# Patient Record
(demographics unavailable — no encounter records)

---

## 2024-10-21 NOTE — HISTORY OF PRESENT ILLNESS
[FreeTextEntry1] : This 36-year-old female is here for a well woman exam and Pap test.  He is also concerned about possible STDs.  Her last Pap test was normal with HPV negative on July 29, 2022.  Her last pelvic ultrasound on July 29, 2022 was normal with 12 mm endometrium and normal ovaries.

## 2024-11-05 NOTE — PHYSICAL EXAM
[de-identified] : Patient is a well-healed surgical skin incision.  Range of motion to the fingers.  There is no triggering.  There is no erythema ecchymoses or abrasions.  No masses.  Strength without thenar atrophy.  There is some tenderness to palpation more on the ulnar aspect of the middle finger with no palpable mass that is at the level of the digital crease

## 2024-11-05 NOTE — ASSESSMENT
[FreeTextEntry1] : Patient removal of hardware.  She has done well from that part.  She will continue with normal activities.  We discussed a cortisone injection to the area is not something that she wanted to do.  We discussed anti-inflammatory medication but she is already taking Arnica.  She will see us back in 2 months to see how she is doing.  Contact the office

## 2024-11-07 NOTE — DISCUSSION/SUMMARY
[FreeTextEntry1] : On pelvic exam today she was noted to have a heavy white discharge with dark brown and black pieces of what appears to be a tampon.  I was able to do a vaginal culture.  And removed as much retained tampon tissue as I could digitally.  Another vaginal culture was taken today.  Specially since she believes that the last time she put a tampon in was 3 to 4 weeks ago.  I have recommended that she douche using baking soda's solution and she will call for the results of today's culture in 1 week.  Any further treatment will be dependent on that result.  The patient will otherwise return to this office as needed.  The patient agrees with the recommendation and the plan

## 2024-11-07 NOTE — HISTORY OF PRESENT ILLNESS
[FreeTextEntry1] : This 36-year-old female  0 is here for a GYN follow-up exam.  She is seeing recurrent vaginal discharge after being treated with metronidazole vaginal gel for bacterial vaginosis.  The last 4 to 5 days she is noticing a black dry cotton appearance to the discharge.  The patient stays that she has been using new tampons recently and is concerned about whether this is tampon tissue.  Her last vaginal culture on 2024 revealed bacterial vaginosis.  Pap testing on 2024 was negative for HPV and normal for cells.  She was also HPV negative.

## 2025-01-06 NOTE — HISTORY OF PRESENT ILLNESS
[de-identified] : 36-year-old female status post hardware removal right wrist.  Feels better than she did prior to the surgery.  She has an improvement in her function.  Some of the pain in her palm is gone.  Still having some pain discomfort around the middle finger.

## 2025-01-06 NOTE — ASSESSMENT
[FreeTextEntry1] : Patient underwent hardware removal right wrist.  She has done well in the postoperative timeframe.  She will see me back on an as needed basis.  Any other issues or problems contact the office otherwise she is discharged from care.  Return to all normal activities

## 2025-01-06 NOTE — PHYSICAL EXAM
[de-identified] : Patient is well-healed surgical skin incision.  Excellent range of motion of the fingers.  No swelling no erythema no ecchymoses no abrasions